# Patient Record
Sex: MALE | URBAN - METROPOLITAN AREA
[De-identification: names, ages, dates, MRNs, and addresses within clinical notes are randomized per-mention and may not be internally consistent; named-entity substitution may affect disease eponyms.]

---

## 2019-05-03 ENCOUNTER — APPOINTMENT (RX ONLY)
Dept: URBAN - METROPOLITAN AREA CLINIC 26 | Facility: CLINIC | Age: 84
Setting detail: DERMATOLOGY
End: 2019-05-03

## 2019-05-03 DIAGNOSIS — L81.4 OTHER MELANIN HYPERPIGMENTATION: ICD-10-CM

## 2019-05-03 DIAGNOSIS — L82.0 INFLAMED SEBORRHEIC KERATOSIS: ICD-10-CM

## 2019-05-03 DIAGNOSIS — D18.0 HEMANGIOMA: ICD-10-CM

## 2019-05-03 DIAGNOSIS — D22 MELANOCYTIC NEVI: ICD-10-CM

## 2019-05-03 DIAGNOSIS — L82.1 OTHER SEBORRHEIC KERATOSIS: ICD-10-CM

## 2019-05-03 DIAGNOSIS — Z85.828 PERSONAL HISTORY OF OTHER MALIGNANT NEOPLASM OF SKIN: ICD-10-CM

## 2019-05-03 PROBLEM — D18.01 HEMANGIOMA OF SKIN AND SUBCUTANEOUS TISSUE: Status: ACTIVE | Noted: 2019-05-03

## 2019-05-03 PROBLEM — D22.9 MELANOCYTIC NEVI, UNSPECIFIED: Status: ACTIVE | Noted: 2019-05-03

## 2019-05-03 PROBLEM — I10 ESSENTIAL (PRIMARY) HYPERTENSION: Status: ACTIVE | Noted: 2019-05-03

## 2019-05-03 PROBLEM — D48.5 NEOPLASM OF UNCERTAIN BEHAVIOR OF SKIN: Status: ACTIVE | Noted: 2019-05-03

## 2019-05-03 PROCEDURE — 99214 OFFICE O/P EST MOD 30 MIN: CPT | Mod: 25

## 2019-05-03 PROCEDURE — ? BIOPSY BY SHAVE METHOD

## 2019-05-03 PROCEDURE — ? COUNSELING

## 2019-05-03 PROCEDURE — 11102 TANGNTL BX SKIN SINGLE LES: CPT

## 2019-05-03 PROCEDURE — 11103 TANGNTL BX SKIN EA SEP/ADDL: CPT

## 2019-05-03 PROCEDURE — ? SUNSCREEN RECOMMENDATIONS

## 2019-05-03 ASSESSMENT — LOCATION DETAILED DESCRIPTION DERM
LOCATION DETAILED: LEFT ANTERIOR PROXIMAL UPPER ARM
LOCATION DETAILED: RIGHT BUTTOCK
LOCATION DETAILED: LEFT PROXIMAL PRETIBIAL REGION
LOCATION DETAILED: LEFT MEDIAL SUPERIOR CHEST
LOCATION DETAILED: RIGHT PROXIMAL PRETIBIAL REGION
LOCATION DETAILED: RIGHT ANKLE
LOCATION DETAILED: LEFT ANKLE

## 2019-05-03 ASSESSMENT — LOCATION SIMPLE DESCRIPTION DERM
LOCATION SIMPLE: LEFT PRETIBIAL REGION
LOCATION SIMPLE: RIGHT PRETIBIAL REGION
LOCATION SIMPLE: CHEST
LOCATION SIMPLE: LEFT ANKLE
LOCATION SIMPLE: RIGHT ANKLE
LOCATION SIMPLE: RIGHT BUTTOCK
LOCATION SIMPLE: LEFT UPPER ARM

## 2019-05-03 ASSESSMENT — LOCATION ZONE DERM
LOCATION ZONE: TRUNK
LOCATION ZONE: LEG
LOCATION ZONE: ARM

## 2019-05-03 NOTE — PROCEDURE: BIOPSY BY SHAVE METHOD
Bill 20526 For Specimen Handling/Conveyance To Laboratory?: no
Depth Of Biopsy: dermis
X Size Of Lesion In Cm: 0
Biopsy Type: H and E
Lab Facility: [id number]
Wound Care: Petrolatum
Cryotherapy Text: The wound bed was treated with cryotherapy after the biopsy was performed.
Anesthesia Volume In Cc (Will Not Render If 0): 0.5
Consent: Written consent was obtained and risks were reviewed including but not limited to scarring, infection, bleeding, scabbing, incomplete removal, nerve damage and allergy to anesthesia.
Lab: -17
Curettage Text: The wound bed was treated with curettage after the biopsy was performed.
Notification Instructions: Patient will be notified of biopsy results. However, patient instructed to call the office if not contacted within 2 weeks.
Was A Bandage Applied: Yes
Biopsy Method: Dermablade
Silver Nitrate Text: The wound bed was treated with silver nitrate after the biopsy was performed.
Hemostasis: Electrocautery
Detail Level: Detailed
Post-Care Instructions: I reviewed with the patient in detail post-care instructions. Patient is to keep the biopsy site dry overnight, and then apply bacitracin twice daily until healed. Patient may apply hydrogen peroxide soaks to remove any crusting.
Dressing: bandage
Electrodesiccation And Curettage Text: The wound bed was treated with electrodesiccation and curettage after the biopsy was performed.
Anesthesia Type: 1% lidocaine with epinephrine
Type Of Destruction Used: Curettage
Billing Type: Third-Party Bill
Path Notes (To The Dermatopathologist): .
Electrodesiccation Text: The wound bed was treated with electrodesiccation after the biopsy was performed.
Path Notes (To The Dermatopathologist): .

## 2019-06-07 ENCOUNTER — APPOINTMENT (RX ONLY)
Dept: URBAN - METROPOLITAN AREA CLINIC 26 | Facility: CLINIC | Age: 84
Setting detail: DERMATOLOGY
End: 2019-06-07

## 2019-06-07 PROBLEM — C44.529 SQUAMOUS CELL CARCINOMA OF SKIN OF OTHER PART OF TRUNK: Status: ACTIVE | Noted: 2019-06-07

## 2019-06-07 PROCEDURE — ? CURETTAGE AND DESTRUCTION

## 2019-06-07 PROCEDURE — ? COUNSELING

## 2019-06-07 PROCEDURE — 17262 DSTRJ MAL LES T/A/L 1.1-2.0: CPT

## 2021-01-15 ENCOUNTER — APPOINTMENT (RX ONLY)
Dept: URBAN - METROPOLITAN AREA CLINIC 21 | Facility: CLINIC | Age: 86
Setting detail: DERMATOLOGY
End: 2021-01-15

## 2021-01-15 DIAGNOSIS — L82.1 OTHER SEBORRHEIC KERATOSIS: ICD-10-CM

## 2021-01-15 DIAGNOSIS — Z85.828 PERSONAL HISTORY OF OTHER MALIGNANT NEOPLASM OF SKIN: ICD-10-CM

## 2021-01-15 DIAGNOSIS — L81.4 OTHER MELANIN HYPERPIGMENTATION: ICD-10-CM

## 2021-01-15 DIAGNOSIS — D22 MELANOCYTIC NEVI: ICD-10-CM

## 2021-01-15 DIAGNOSIS — L57.0 ACTINIC KERATOSIS: ICD-10-CM

## 2021-01-15 PROBLEM — D22.5 MELANOCYTIC NEVI OF TRUNK: Status: ACTIVE | Noted: 2021-01-15

## 2021-01-15 PROCEDURE — ? TREATMENT REGIMEN

## 2021-01-15 PROCEDURE — 17003 DESTRUCT PREMALG LES 2-14: CPT

## 2021-01-15 PROCEDURE — ? ADDITIONAL NOTES

## 2021-01-15 PROCEDURE — ? LIQUID NITROGEN

## 2021-01-15 PROCEDURE — ? COUNSELING

## 2021-01-15 PROCEDURE — 17000 DESTRUCT PREMALG LESION: CPT

## 2021-01-15 PROCEDURE — 99213 OFFICE O/P EST LOW 20 MIN: CPT | Mod: 25

## 2021-01-15 ASSESSMENT — LOCATION DETAILED DESCRIPTION DERM
LOCATION DETAILED: RIGHT INFERIOR FOREHEAD
LOCATION DETAILED: LEFT SUPERIOR MEDIAL FOREHEAD
LOCATION DETAILED: LEFT SUPERIOR MEDIAL UPPER BACK
LOCATION DETAILED: LEFT ANTERIOR SHOULDER
LOCATION DETAILED: LEFT MEDIAL UPPER BACK

## 2021-01-15 ASSESSMENT — LOCATION ZONE DERM
LOCATION ZONE: TRUNK
LOCATION ZONE: ARM
LOCATION ZONE: FACE

## 2021-01-15 ASSESSMENT — LOCATION SIMPLE DESCRIPTION DERM
LOCATION SIMPLE: RIGHT FOREHEAD
LOCATION SIMPLE: LEFT SHOULDER
LOCATION SIMPLE: LEFT UPPER BACK
LOCATION SIMPLE: LEFT FOREHEAD

## 2021-01-15 NOTE — PROCEDURE: LIQUID NITROGEN
Detail Level: Simple
Post-Care Instructions: I reviewed with the patient in detail post-care instructions. Patient is to wear sunprotection, and avoid picking at any of the treated lesions. Pt may apply Vaseline to crusted or scabbing areas.
Number Of Freeze-Thaw Cycles: 2 freeze-thaw cycles
Render Post-Care Instructions In Note?: yes
Consent: The patient's consent was obtained including but not limited to risks of crusting, scabbing, blistering, scarring, darker or lighter pigmentary change, recurrence, incomplete removal and infection.
Duration Of Freeze Thaw-Cycle (Seconds): 5
Render Note In Bullet Format When Appropriate: No

## 2022-07-08 ENCOUNTER — APPOINTMENT (RX ONLY)
Dept: URBAN - METROPOLITAN AREA CLINIC 21 | Facility: CLINIC | Age: 87
Setting detail: DERMATOLOGY
End: 2022-07-08

## 2022-07-08 DIAGNOSIS — T07XXXA ABRASION OR FRICTION BURN OF OTHER, MULTIPLE, AND UNSPECIFIED SITES, WITHOUT MENTION OF INFECTION: ICD-10-CM

## 2022-07-08 PROBLEM — S80.811A ABRASION, RIGHT LOWER LEG, INITIAL ENCOUNTER: Status: ACTIVE | Noted: 2022-07-08

## 2022-07-08 PROBLEM — C44.722 SQUAMOUS CELL CARCINOMA OF SKIN OF RIGHT LOWER LIMB, INCLUDING HIP: Status: ACTIVE | Noted: 2022-07-08

## 2022-07-08 PROCEDURE — 11102 TANGNTL BX SKIN SINGLE LES: CPT

## 2022-07-08 PROCEDURE — ? PRESCRIPTION

## 2022-07-08 PROCEDURE — ? BIOPSY BY SHAVE METHOD

## 2022-07-08 PROCEDURE — 99212 OFFICE O/P EST SF 10 MIN: CPT | Mod: 25

## 2022-07-08 PROCEDURE — ? TREATMENT REGIMEN

## 2022-07-08 PROCEDURE — ? COUNSELING

## 2022-07-08 RX ORDER — MUPIROCIN 20 MG/G
OINTMENT TOPICAL
Qty: 22 | Refills: 3 | Status: ERX | COMMUNITY
Start: 2022-07-08

## 2022-07-08 RX ADMIN — MUPIROCIN 1: 20 OINTMENT TOPICAL at 00:00

## 2022-07-08 ASSESSMENT — LOCATION SIMPLE DESCRIPTION DERM: LOCATION SIMPLE: RIGHT ACHILLES SKIN

## 2022-07-08 ASSESSMENT — LOCATION DETAILED DESCRIPTION DERM: LOCATION DETAILED: RIGHT ACHILLES SKIN

## 2022-07-08 ASSESSMENT — LOCATION ZONE DERM: LOCATION ZONE: LEG

## 2022-07-08 NOTE — PROCEDURE: MIPS QUALITY
Quality 226: Preventive Care And Screening: Tobacco Use: Screening And Cessation Intervention: Patient screened for tobacco use and is an ex/non-smoker
Quality 111:Pneumonia Vaccination Status For Older Adults: Pneumococcal vaccine administered on or after patient’s 60th birthday and before the end of the measurement period
Quality 431: Preventive Care And Screening: Unhealthy Alcohol Use - Screening: Patient not identified as an unhealthy alcohol user when screened for unhealthy alcohol use using a systematic screening method
Quality 130: Documentation Of Current Medications In The Medical Record: Current Medications Documented
Quality 47: Advance Care Plan: Advance Care Planning discussed and documented; advance care plan or surrogate decision maker documented in the medical record.
Detail Level: Detailed

## 2022-08-17 ENCOUNTER — APPOINTMENT (RX ONLY)
Dept: URBAN - METROPOLITAN AREA CLINIC 26 | Facility: CLINIC | Age: 87
Setting detail: DERMATOLOGY
End: 2022-08-17

## 2022-08-17 PROBLEM — C44.722 SQUAMOUS CELL CARCINOMA OF SKIN OF RIGHT LOWER LIMB, INCLUDING HIP: Status: ACTIVE | Noted: 2022-08-17

## 2022-08-17 PROCEDURE — 17313 MOHS 1 STAGE T/A/L: CPT

## 2022-08-17 PROCEDURE — ? ADDITIONAL NOTES

## 2022-08-17 PROCEDURE — ? MOHS SURGERY

## 2022-09-07 ENCOUNTER — APPOINTMENT (RX ONLY)
Dept: URBAN - METROPOLITAN AREA CLINIC 26 | Facility: CLINIC | Age: 87
Setting detail: DERMATOLOGY
End: 2022-09-07

## 2022-09-07 PROBLEM — C44.722 SQUAMOUS CELL CARCINOMA OF SKIN OF RIGHT LOWER LIMB, INCLUDING HIP: Status: ACTIVE | Noted: 2022-09-07

## 2022-09-07 PROCEDURE — ? OTC TREATMENT REGIMEN

## 2022-09-07 PROCEDURE — ? ADDITIONAL NOTES

## 2022-09-07 PROCEDURE — ? POST-OP WOUND CHECK (NO GLOBAL PERIOD)

## 2022-09-07 PROCEDURE — 99213 OFFICE O/P EST LOW 20 MIN: CPT

## 2022-09-07 NOTE — PROCEDURE: POST-OP WOUND CHECK (NO GLOBAL PERIOD)
Detail Level: Detailed
Wound Evaluated By: Dwight Weathers M.D.\\n\\nWound care instructions were reviewed in detail.

## 2022-09-07 NOTE — PROCEDURE: MIPS QUALITY
Quality 226: Preventive Care And Screening: Tobacco Use: Screening And Cessation Intervention: Patient screened for tobacco use and is an ex/non-smoker
Quality 111:Pneumonia Vaccination Status For Older Adults: Pneumococcal vaccine administered on or after patient’s 60th birthday and before the end of the measurement period
Detail Level: Detailed
Quality 130: Documentation Of Current Medications In The Medical Record: Current Medications Documented
Quality 431: Preventive Care And Screening: Unhealthy Alcohol Use - Screening: Patient not identified as an unhealthy alcohol user when screened for unhealthy alcohol use using a systematic screening method
Quality 110: Preventive Care And Screening: Influenza Immunization: Influenza Immunization Administered during Influenza season
Quality 47: Advance Care Plan: Advance Care Planning discussed and documented; advance care plan or surrogate decision maker documented in the medical record.

## 2022-10-06 ENCOUNTER — APPOINTMENT (RX ONLY)
Dept: URBAN - METROPOLITAN AREA CLINIC 26 | Facility: CLINIC | Age: 87
Setting detail: DERMATOLOGY
End: 2022-10-06

## 2022-10-06 PROBLEM — C44.722 SQUAMOUS CELL CARCINOMA OF SKIN OF RIGHT LOWER LIMB, INCLUDING HIP: Status: ACTIVE | Noted: 2022-10-06

## 2022-10-06 PROCEDURE — ? OTC TREATMENT REGIMEN

## 2022-10-06 PROCEDURE — 99213 OFFICE O/P EST LOW 20 MIN: CPT

## 2022-10-06 PROCEDURE — ? POST-OP WOUND CHECK (NO GLOBAL PERIOD)

## 2022-10-06 NOTE — PROCEDURE: POST-OP WOUND CHECK (NO GLOBAL PERIOD)
Detail Level: Detailed
Wound Dressing Override (Optional): hydrafoam dressing, gauze secondary dressing, and hypafix
Wound Evaluated By: Dwight Weathers M.D.\\n\\nWound care instructions were reviewed in detail.

## 2022-10-17 ENCOUNTER — APPOINTMENT (RX ONLY)
Dept: URBAN - METROPOLITAN AREA CLINIC 26 | Facility: CLINIC | Age: 87
Setting detail: DERMATOLOGY
End: 2022-10-17

## 2022-10-17 PROBLEM — C44.722 SQUAMOUS CELL CARCINOMA OF SKIN OF RIGHT LOWER LIMB, INCLUDING HIP: Status: ACTIVE | Noted: 2022-10-17

## 2022-10-17 PROCEDURE — 99213 OFFICE O/P EST LOW 20 MIN: CPT

## 2022-10-17 PROCEDURE — ? OTC TREATMENT REGIMEN

## 2022-10-17 PROCEDURE — ? POST-OP WOUND CHECK (NO GLOBAL PERIOD)

## 2022-10-17 PROCEDURE — ? ADDITIONAL NOTES

## 2022-10-17 NOTE — PROCEDURE: POST-OP WOUND CHECK (NO GLOBAL PERIOD)
Detail Level: Detailed
Wound Dressing Override (Optional): Hydrocellular foam dressing
Wound Evaluated By: Dwight Weathers M.D.\\n\\nWound care instructions were reviewed in detail.

## 2022-11-02 ENCOUNTER — APPOINTMENT (RX ONLY)
Dept: URBAN - METROPOLITAN AREA CLINIC 26 | Facility: CLINIC | Age: 87
Setting detail: DERMATOLOGY
End: 2022-11-02

## 2022-11-02 DIAGNOSIS — I83019 VARICOSE VEINS OF LOWER EXTREMITIES WITH ULCER: ICD-10-CM

## 2022-11-02 DIAGNOSIS — I83009 VARICOSE VEINS OF LOWER EXTREMITIES WITH ULCER: ICD-10-CM

## 2022-11-02 DIAGNOSIS — I83029 VARICOSE VEINS OF LOWER EXTREMITIES WITH ULCER: ICD-10-CM

## 2022-11-02 PROBLEM — C44.722 SQUAMOUS CELL CARCINOMA OF SKIN OF RIGHT LOWER LIMB, INCLUDING HIP: Status: ACTIVE | Noted: 2022-11-02

## 2022-11-02 PROBLEM — L97.819 NON-PRESSURE CHRONIC ULCER OF OTHER PART OF RIGHT LOWER LEG WITH UNSPECIFIED SEVERITY: Status: ACTIVE | Noted: 2022-11-02

## 2022-11-02 PROCEDURE — ? UNNA BOOT APPLICATION

## 2022-11-02 PROCEDURE — A6443 CONFORM BAND N/S W>=3"<5"/YD: HCPCS

## 2022-11-02 PROCEDURE — ? POST-OP WOUND CHECK (NO GLOBAL PERIOD)

## 2022-11-02 PROCEDURE — ? OTC TREATMENT REGIMEN

## 2022-11-02 PROCEDURE — A6456 ZINC PASTE BAND W >=3"<5"/YD: HCPCS

## 2022-11-02 PROCEDURE — 99213 OFFICE O/P EST LOW 20 MIN: CPT | Mod: 25

## 2022-11-02 PROCEDURE — A6453 SELF-ADHER BAND W <3"/YD: HCPCS

## 2022-11-02 PROCEDURE — ? ADDITIONAL NOTES

## 2022-11-02 PROCEDURE — 29580 STRAPPING UNNA BOOT: CPT | Mod: RT

## 2022-11-02 ASSESSMENT — LOCATION SIMPLE DESCRIPTION DERM: LOCATION SIMPLE: RIGHT PRETIBIAL REGION

## 2022-11-02 ASSESSMENT — LOCATION DETAILED DESCRIPTION DERM: LOCATION DETAILED: RIGHT MEDIAL DISTAL PRETIBIAL REGION

## 2022-11-02 ASSESSMENT — LOCATION ZONE DERM: LOCATION ZONE: LEG

## 2022-11-02 NOTE — PROCEDURE: POST-OP WOUND CHECK (NO GLOBAL PERIOD)
Detail Level: Detailed
Wound Dressing Override (Optional): foam dressing and unna boot
Wound Evaluated By: Dwight Weathers M.D.\\n\\nWound care instructions were reviewed in detail.

## 2022-11-02 NOTE — PROCEDURE: UNNA BOOT APPLICATION
Detail Level: Detailed
Location Applied: the right leg
Indication: stasis ulcer
Other Medication Occluded Under Unnaboot: Hydrocellular foam over ulcer
Was A Previous Unna Boot Removed?: No
After Unna Boot Application Text: Coban was used to wrap the outside of Unna Boot and we ensured the Unna Boot wasn't too tight prior to the patient leaving the office.
Removal Of Previous Unna Boot (Yes) Text: The previous Unna Boot was removed and then the site was cleaned in preparation for another Unna Boot application.
Removal Of Previous Unna Boot (No) Text: The site was cleaned in preparation for an Unna Boot application.
Bill For Unna Boot Supplies?: Yes
Coban Units: 1

## 2022-11-02 NOTE — PROCEDURE: MIPS QUALITY
Quality 110: Preventive Care And Screening: Influenza Immunization: Influenza Immunization Administered during Influenza season
Quality 47: Advance Care Plan: Advance Care Planning discussed and documented; advance care plan or surrogate decision maker documented in the medical record.
Quality 226: Preventive Care And Screening: Tobacco Use: Screening And Cessation Intervention: Patient screened for tobacco use and is an ex/non-smoker
Quality 111:Pneumonia Vaccination Status For Older Adults: Pneumococcal vaccine (PPSV23) administered on or after patient’s 60th birthday and before the end of the measurement period
Detail Level: Detailed
Quality 130: Documentation Of Current Medications In The Medical Record: Current Medications Documented
Quality 431: Preventive Care And Screening: Unhealthy Alcohol Use - Screening: Patient not identified as an unhealthy alcohol user when screened for unhealthy alcohol use using a systematic screening method

## 2022-11-10 ENCOUNTER — APPOINTMENT (RX ONLY)
Dept: URBAN - METROPOLITAN AREA CLINIC 26 | Facility: CLINIC | Age: 87
Setting detail: DERMATOLOGY
End: 2022-11-10

## 2022-11-10 DIAGNOSIS — I83019 VARICOSE VEINS OF LOWER EXTREMITIES WITH ULCER: ICD-10-CM

## 2022-11-10 DIAGNOSIS — I83009 VARICOSE VEINS OF LOWER EXTREMITIES WITH ULCER: ICD-10-CM

## 2022-11-10 DIAGNOSIS — I83029 VARICOSE VEINS OF LOWER EXTREMITIES WITH ULCER: ICD-10-CM

## 2022-11-10 PROBLEM — L97.819 NON-PRESSURE CHRONIC ULCER OF OTHER PART OF RIGHT LOWER LEG WITH UNSPECIFIED SEVERITY: Status: ACTIVE | Noted: 2022-11-10

## 2022-11-10 PROCEDURE — A6453 SELF-ADHER BAND W <3"/YD: HCPCS

## 2022-11-10 PROCEDURE — A6456 ZINC PASTE BAND W >=3"<5"/YD: HCPCS

## 2022-11-10 PROCEDURE — 29580 STRAPPING UNNA BOOT: CPT | Mod: RT

## 2022-11-10 PROCEDURE — ? UNNA BOOT APPLICATION

## 2022-11-10 PROCEDURE — A6443 CONFORM BAND N/S W>=3"<5"/YD: HCPCS

## 2022-11-10 PROCEDURE — ? ADDITIONAL NOTES

## 2022-11-10 ASSESSMENT — LOCATION DETAILED DESCRIPTION DERM: LOCATION DETAILED: RIGHT MEDIAL DISTAL PRETIBIAL REGION

## 2022-11-10 ASSESSMENT — LOCATION ZONE DERM: LOCATION ZONE: LEG

## 2022-11-10 ASSESSMENT — LOCATION SIMPLE DESCRIPTION DERM: LOCATION SIMPLE: RIGHT PRETIBIAL REGION

## 2022-11-10 NOTE — PROCEDURE: UNNA BOOT APPLICATION
Location Applied: the right leg
Bill For Unna Boot Supplies?: Yes
Removal Of Previous Unna Boot (Yes) Text: The previous Unna Boot was removed and then the site was cleaned in preparation for another Unna Boot application.
Indication: stasis ulcer
Zinc Paste Impregnated Bandage Units: 1
Detail Level: Detailed
Was A Previous Unna Boot Removed?: No
Removal Of Previous Unna Boot (No) Text: The site was cleaned in preparation for an Unna Boot application.
Other Medication Occluded Under Unnaboot: hydrocellular foam dressing over the wound
After Unna Boot Application Text: Coban was used to wrap the outside of Unna Boot and we ensured the Unna Boot wasn't too tight prior to the patient leaving the office.

## 2023-02-03 NOTE — PROCEDURE: TREATMENT REGIMEN
Initiate Treatment: mupirocin 2 % topical ointment; Apply to affected area on face 2-3x daily x10-14 days.
Detail Level: Zone
no